# Patient Record
Sex: MALE | Employment: UNEMPLOYED | URBAN - METROPOLITAN AREA
[De-identification: names, ages, dates, MRNs, and addresses within clinical notes are randomized per-mention and may not be internally consistent; named-entity substitution may affect disease eponyms.]

---

## 2018-04-12 ENCOUNTER — HOSPITAL ENCOUNTER (INPATIENT)
Age: 53
LOS: 7 days | Discharge: HOME | DRG: 885 | End: 2018-04-19
Attending: PSYCHIATRY & NEUROLOGY | Admitting: PSYCHIATRY & NEUROLOGY
Payer: MEDICARE

## 2018-04-12 PROBLEM — F29 PSYCHOSIS (HCC): Status: ACTIVE | Noted: 2018-04-12

## 2018-04-12 PROBLEM — F23 ACUTE PSYCHOSIS (HCC): Status: ACTIVE | Noted: 2018-04-12

## 2018-04-12 PROCEDURE — 6370000000 HC RX 637 (ALT 250 FOR IP): Performed by: NURSE PRACTITIONER

## 2018-04-12 PROCEDURE — 1240000000 HC EMOTIONAL WELLNESS R&B

## 2018-04-12 RX ORDER — NICOTINE 21 MG/24HR
1 PATCH, TRANSDERMAL 24 HOURS TRANSDERMAL DAILY
Status: DISCONTINUED | OUTPATIENT
Start: 2018-04-13 | End: 2018-04-19 | Stop reason: HOSPADM

## 2018-04-12 RX ORDER — MAGNESIUM HYDROXIDE/ALUMINUM HYDROXICE/SIMETHICONE 120; 1200; 1200 MG/30ML; MG/30ML; MG/30ML
30 SUSPENSION ORAL EVERY 6 HOURS PRN
Status: DISCONTINUED | OUTPATIENT
Start: 2018-04-12 | End: 2018-04-19 | Stop reason: HOSPADM

## 2018-04-12 RX ORDER — HYDROXYZINE HYDROCHLORIDE 25 MG/1
50 TABLET, FILM COATED ORAL 3 TIMES DAILY PRN
Status: DISCONTINUED | OUTPATIENT
Start: 2018-04-12 | End: 2018-04-19 | Stop reason: HOSPADM

## 2018-04-12 RX ORDER — TRAZODONE HYDROCHLORIDE 50 MG/1
50 TABLET ORAL NIGHTLY PRN
Status: DISCONTINUED | OUTPATIENT
Start: 2018-04-13 | End: 2018-04-19 | Stop reason: HOSPADM

## 2018-04-12 RX ORDER — PHENTERMINE HYDROCHLORIDE 15 MG/1
15 CAPSULE ORAL EVERY MORNING
Status: ON HOLD | COMMUNITY
End: 2018-04-18 | Stop reason: HOSPADM

## 2018-04-12 RX ORDER — PERMETHRIN 50 MG/G
CREAM TOPICAL ONCE
Status: COMPLETED | OUTPATIENT
Start: 2018-04-12 | End: 2018-04-12

## 2018-04-12 RX ORDER — ATORVASTATIN CALCIUM 20 MG/1
40 TABLET, FILM COATED ORAL DAILY
Status: DISCONTINUED | OUTPATIENT
Start: 2018-04-13 | End: 2018-04-19 | Stop reason: HOSPADM

## 2018-04-12 RX ORDER — PERMETHRIN 50 MG/G
CREAM TOPICAL ONCE
COMMUNITY

## 2018-04-12 RX ORDER — BENZTROPINE MESYLATE 1 MG/ML
2 INJECTION INTRAMUSCULAR; INTRAVENOUS 2 TIMES DAILY PRN
Status: DISCONTINUED | OUTPATIENT
Start: 2018-04-12 | End: 2018-04-19 | Stop reason: HOSPADM

## 2018-04-12 RX ORDER — ATORVASTATIN CALCIUM 40 MG/1
40 TABLET, FILM COATED ORAL DAILY
COMMUNITY

## 2018-04-12 RX ORDER — HALOPERIDOL 5 MG/ML
5 INJECTION INTRAMUSCULAR EVERY 4 HOURS PRN
Status: DISCONTINUED | OUTPATIENT
Start: 2018-04-12 | End: 2018-04-19 | Stop reason: HOSPADM

## 2018-04-12 RX ORDER — ACETAMINOPHEN 325 MG/1
650 TABLET ORAL EVERY 4 HOURS PRN
Status: DISCONTINUED | OUTPATIENT
Start: 2018-04-12 | End: 2018-04-19 | Stop reason: HOSPADM

## 2018-04-12 RX ADMIN — PERMETHRIN: 50 CREAM TOPICAL at 23:00

## 2018-04-12 ASSESSMENT — SLEEP AND FATIGUE QUESTIONNAIRES
DIFFICULTY STAYING ASLEEP: YES
AVERAGE NUMBER OF SLEEP HOURS: 2
DO YOU HAVE DIFFICULTY SLEEPING: YES
DIFFICULTY ARISING: NO
SLEEP PATTERN: DIFFICULTY FALLING ASLEEP;DISTURBED/INTERRUPTED SLEEP;RESTLESSNESS
DIFFICULTY FALLING ASLEEP: YES
RESTFUL SLEEP: NO
DO YOU USE A SLEEP AID: NO

## 2018-04-12 ASSESSMENT — LIFESTYLE VARIABLES: HISTORY_ALCOHOL_USE: NO

## 2018-04-13 LAB
ABSOLUTE EOS #: 0.2 K/UL (ref 0–0.4)
ABSOLUTE IMMATURE GRANULOCYTE: ABNORMAL K/UL (ref 0–0.3)
ABSOLUTE LYMPH #: 1 K/UL (ref 1–4.8)
ABSOLUTE MONO #: 0.9 K/UL (ref 0.1–1.3)
ALBUMIN SERPL-MCNC: 3.6 G/DL (ref 3.5–5.2)
ALBUMIN/GLOBULIN RATIO: ABNORMAL (ref 1–2.5)
ALP BLD-CCNC: 120 U/L (ref 40–129)
ALT SERPL-CCNC: 23 U/L (ref 5–41)
ANION GAP SERPL CALCULATED.3IONS-SCNC: 9 MMOL/L (ref 9–17)
AST SERPL-CCNC: 27 U/L
BASOPHILS # BLD: 1 % (ref 0–2)
BASOPHILS ABSOLUTE: 0 K/UL (ref 0–0.2)
BILIRUB SERPL-MCNC: 0.69 MG/DL (ref 0.3–1.2)
BUN BLDV-MCNC: 10 MG/DL (ref 6–20)
BUN/CREAT BLD: ABNORMAL (ref 9–20)
CALCIUM SERPL-MCNC: 8.4 MG/DL (ref 8.6–10.4)
CHLORIDE BLD-SCNC: 107 MMOL/L (ref 98–107)
CHOLESTEROL/HDL RATIO: 2
CHOLESTEROL: 105 MG/DL
CO2: 24 MMOL/L (ref 20–31)
CREAT SERPL-MCNC: 0.64 MG/DL (ref 0.7–1.2)
DIFFERENTIAL TYPE: ABNORMAL
EOSINOPHILS RELATIVE PERCENT: 2 % (ref 0–4)
GFR AFRICAN AMERICAN: >60 ML/MIN
GFR NON-AFRICAN AMERICAN: >60 ML/MIN
GFR SERPL CREATININE-BSD FRML MDRD: ABNORMAL ML/MIN/{1.73_M2}
GFR SERPL CREATININE-BSD FRML MDRD: ABNORMAL ML/MIN/{1.73_M2}
GLUCOSE BLD-MCNC: 96 MG/DL (ref 70–99)
HCT VFR BLD CALC: 43 % (ref 41–53)
HDLC SERPL-MCNC: 53 MG/DL
HEMOGLOBIN: 14.4 G/DL (ref 13.5–17.5)
IMMATURE GRANULOCYTES: ABNORMAL %
LDL CHOLESTEROL: 38 MG/DL (ref 0–130)
LYMPHOCYTES # BLD: 13 % (ref 24–44)
MCH RBC QN AUTO: 32 PG (ref 26–34)
MCHC RBC AUTO-ENTMCNC: 33.5 G/DL (ref 31–37)
MCV RBC AUTO: 95.7 FL (ref 80–100)
MONOCYTES # BLD: 11 % (ref 1–7)
NRBC AUTOMATED: ABNORMAL PER 100 WBC
PDW BLD-RTO: 15 % (ref 11.5–14.9)
PLATELET # BLD: 207 K/UL (ref 150–450)
PLATELET ESTIMATE: ABNORMAL
PMV BLD AUTO: 8.6 FL (ref 6–12)
POTASSIUM SERPL-SCNC: 4.1 MMOL/L (ref 3.7–5.3)
RBC # BLD: 4.49 M/UL (ref 4.5–5.9)
RBC # BLD: ABNORMAL 10*6/UL
SEG NEUTROPHILS: 73 % (ref 36–66)
SEGMENTED NEUTROPHILS ABSOLUTE COUNT: 6.1 K/UL (ref 1.3–9.1)
SODIUM BLD-SCNC: 140 MMOL/L (ref 135–144)
THYROXINE, FREE: 0.97 NG/DL (ref 0.93–1.7)
TOTAL PROTEIN: 6.3 G/DL (ref 6.4–8.3)
TRIGL SERPL-MCNC: 72 MG/DL
TSH SERPL DL<=0.05 MIU/L-ACNC: 1.57 MIU/L (ref 0.3–5)
VLDLC SERPL CALC-MCNC: NORMAL MG/DL (ref 1–30)
WBC # BLD: 8.3 K/UL (ref 3.5–11)
WBC # BLD: ABNORMAL 10*3/UL

## 2018-04-13 PROCEDURE — 1240000000 HC EMOTIONAL WELLNESS R&B

## 2018-04-13 PROCEDURE — 80053 COMPREHEN METABOLIC PANEL: CPT

## 2018-04-13 PROCEDURE — 84439 ASSAY OF FREE THYROXINE: CPT

## 2018-04-13 PROCEDURE — 80061 LIPID PANEL: CPT

## 2018-04-13 PROCEDURE — 84443 ASSAY THYROID STIM HORMONE: CPT

## 2018-04-13 PROCEDURE — 85025 COMPLETE CBC W/AUTO DIFF WBC: CPT

## 2018-04-13 PROCEDURE — 6370000000 HC RX 637 (ALT 250 FOR IP): Performed by: NURSE PRACTITIONER

## 2018-04-13 PROCEDURE — 90792 PSYCH DIAG EVAL W/MED SRVCS: CPT | Performed by: NURSE PRACTITIONER

## 2018-04-13 PROCEDURE — 36415 COLL VENOUS BLD VENIPUNCTURE: CPT

## 2018-04-13 RX ORDER — RISPERIDONE 1 MG/1
1 TABLET, FILM COATED ORAL 2 TIMES DAILY
Status: DISCONTINUED | OUTPATIENT
Start: 2018-04-13 | End: 2018-04-17

## 2018-04-13 RX ADMIN — TRAZODONE HYDROCHLORIDE 50 MG: 50 TABLET ORAL at 20:29

## 2018-04-13 RX ADMIN — ATORVASTATIN CALCIUM 40 MG: 20 TABLET, FILM COATED ORAL at 08:10

## 2018-04-13 RX ADMIN — RISPERIDONE 1 MG: 1 TABLET ORAL at 09:56

## 2018-04-13 RX ADMIN — RISPERIDONE 1 MG: 1 TABLET ORAL at 20:29

## 2018-04-13 ASSESSMENT — LIFESTYLE VARIABLES: HISTORY_ALCOHOL_USE: NO

## 2018-04-14 PROCEDURE — 6370000000 HC RX 637 (ALT 250 FOR IP): Performed by: NURSE PRACTITIONER

## 2018-04-14 PROCEDURE — 1240000000 HC EMOTIONAL WELLNESS R&B

## 2018-04-14 PROCEDURE — 99232 SBSQ HOSP IP/OBS MODERATE 35: CPT | Performed by: PSYCHIATRY & NEUROLOGY

## 2018-04-14 RX ADMIN — HYDROXYZINE HYDROCHLORIDE 50 MG: 25 TABLET, FILM COATED ORAL at 21:14

## 2018-04-14 RX ADMIN — TRAZODONE HYDROCHLORIDE 50 MG: 50 TABLET ORAL at 21:22

## 2018-04-14 RX ADMIN — RISPERIDONE 1 MG: 1 TABLET ORAL at 21:22

## 2018-04-14 RX ADMIN — RISPERIDONE 1 MG: 1 TABLET ORAL at 08:47

## 2018-04-14 RX ADMIN — ATORVASTATIN CALCIUM 40 MG: 20 TABLET, FILM COATED ORAL at 08:47

## 2018-04-14 ASSESSMENT — PAIN SCALES - GENERAL: PAINLEVEL_OUTOF10: 0

## 2018-04-15 PROCEDURE — 1240000000 HC EMOTIONAL WELLNESS R&B

## 2018-04-15 PROCEDURE — 6370000000 HC RX 637 (ALT 250 FOR IP): Performed by: NURSE PRACTITIONER

## 2018-04-15 RX ADMIN — RISPERIDONE 1 MG: 1 TABLET ORAL at 22:06

## 2018-04-15 RX ADMIN — ATORVASTATIN CALCIUM 40 MG: 20 TABLET, FILM COATED ORAL at 08:16

## 2018-04-15 RX ADMIN — RISPERIDONE 1 MG: 1 TABLET ORAL at 08:17

## 2018-04-16 LAB
AMPHETAMINE SCREEN URINE: NEGATIVE
BARBITURATE SCREEN URINE: NEGATIVE
BENZODIAZEPINE SCREEN, URINE: NEGATIVE
BUPRENORPHINE URINE: NORMAL
CANNABINOID SCREEN URINE: NEGATIVE
COCAINE METABOLITE, URINE: NEGATIVE
MDMA URINE: NORMAL
METHADONE SCREEN, URINE: NEGATIVE
METHAMPHETAMINE, URINE: NORMAL
OPIATES, URINE: NEGATIVE
OXYCODONE SCREEN URINE: NEGATIVE
PHENCYCLIDINE, URINE: NEGATIVE
PROPOXYPHENE, URINE: NORMAL
TEST INFORMATION: NORMAL
TRICYCLIC ANTIDEPRESSANTS, UR: NORMAL

## 2018-04-16 PROCEDURE — 99232 SBSQ HOSP IP/OBS MODERATE 35: CPT | Performed by: NURSE PRACTITIONER

## 2018-04-16 PROCEDURE — 80307 DRUG TEST PRSMV CHEM ANLYZR: CPT

## 2018-04-16 PROCEDURE — 1240000000 HC EMOTIONAL WELLNESS R&B

## 2018-04-16 PROCEDURE — 6370000000 HC RX 637 (ALT 250 FOR IP): Performed by: NURSE PRACTITIONER

## 2018-04-16 RX ORDER — CETIRIZINE HYDROCHLORIDE 10 MG/1
10 TABLET ORAL DAILY
Status: DISCONTINUED | OUTPATIENT
Start: 2018-04-16 | End: 2018-04-18

## 2018-04-16 RX ADMIN — RISPERIDONE 1 MG: 1 TABLET ORAL at 21:33

## 2018-04-16 RX ADMIN — ATORVASTATIN CALCIUM 40 MG: 20 TABLET, FILM COATED ORAL at 07:51

## 2018-04-16 RX ADMIN — CETIRIZINE HYDROCHLORIDE 10 MG: 10 TABLET, FILM COATED ORAL at 18:01

## 2018-04-16 RX ADMIN — RISPERIDONE 1 MG: 1 TABLET ORAL at 07:51

## 2018-04-17 PROCEDURE — 6370000000 HC RX 637 (ALT 250 FOR IP): Performed by: NURSE PRACTITIONER

## 2018-04-17 PROCEDURE — 99232 SBSQ HOSP IP/OBS MODERATE 35: CPT | Performed by: NURSE PRACTITIONER

## 2018-04-17 PROCEDURE — 1240000000 HC EMOTIONAL WELLNESS R&B

## 2018-04-17 RX ORDER — RISPERIDONE 2 MG/1
2 TABLET, FILM COATED ORAL NIGHTLY
Status: DISCONTINUED | OUTPATIENT
Start: 2018-04-17 | End: 2018-04-18

## 2018-04-17 RX ORDER — RISPERIDONE 1 MG/1
1 TABLET, FILM COATED ORAL DAILY
Status: DISCONTINUED | OUTPATIENT
Start: 2018-04-18 | End: 2018-04-18

## 2018-04-17 RX ADMIN — ATORVASTATIN CALCIUM 40 MG: 20 TABLET, FILM COATED ORAL at 08:47

## 2018-04-17 RX ADMIN — CETIRIZINE HYDROCHLORIDE 10 MG: 10 TABLET, FILM COATED ORAL at 08:30

## 2018-04-17 RX ADMIN — RISPERIDONE 2 MG: 2 TABLET ORAL at 22:13

## 2018-04-17 RX ADMIN — RISPERIDONE 1 MG: 1 TABLET ORAL at 08:30

## 2018-04-18 PROCEDURE — 6370000000 HC RX 637 (ALT 250 FOR IP): Performed by: NURSE PRACTITIONER

## 2018-04-18 PROCEDURE — 1240000000 HC EMOTIONAL WELLNESS R&B

## 2018-04-18 PROCEDURE — 99232 SBSQ HOSP IP/OBS MODERATE 35: CPT | Performed by: NURSE PRACTITIONER

## 2018-04-18 RX ORDER — RISPERIDONE 2 MG/1
2 TABLET, FILM COATED ORAL NIGHTLY
Status: DISCONTINUED | OUTPATIENT
Start: 2018-04-18 | End: 2018-04-19 | Stop reason: HOSPADM

## 2018-04-18 RX ORDER — RISPERIDONE 1 MG/1
1 TABLET, FILM COATED ORAL DAILY
Qty: 14 TABLET | Refills: 0 | Status: SHIPPED | OUTPATIENT
Start: 2018-04-19

## 2018-04-18 RX ORDER — RISPERIDONE 2 MG/1
2 TABLET, FILM COATED ORAL 2 TIMES DAILY
Status: DISCONTINUED | OUTPATIENT
Start: 2018-04-18 | End: 2018-04-18

## 2018-04-18 RX ORDER — TRAZODONE HYDROCHLORIDE 50 MG/1
50 TABLET ORAL NIGHTLY PRN
Qty: 14 TABLET | Refills: 0 | Status: SHIPPED | OUTPATIENT
Start: 2018-04-18

## 2018-04-18 RX ORDER — RISPERIDONE 1 MG/1
1 TABLET, FILM COATED ORAL DAILY
Status: DISCONTINUED | OUTPATIENT
Start: 2018-04-19 | End: 2018-04-19 | Stop reason: HOSPADM

## 2018-04-18 RX ORDER — HYDROXYZINE 50 MG/1
50 TABLET, FILM COATED ORAL 3 TIMES DAILY PRN
Qty: 42 TABLET | Refills: 0 | Status: SHIPPED | OUTPATIENT
Start: 2018-04-18 | End: 2018-04-28

## 2018-04-18 RX ORDER — RISPERIDONE 1 MG/1
1 TABLET, FILM COATED ORAL ONCE
Status: DISCONTINUED | OUTPATIENT
Start: 2018-04-18 | End: 2018-04-18

## 2018-04-18 RX ORDER — RISPERIDONE 2 MG/1
2 TABLET, FILM COATED ORAL NIGHTLY
Qty: 14 TABLET | Refills: 0 | Status: SHIPPED | OUTPATIENT
Start: 2018-04-18

## 2018-04-18 RX ADMIN — HYDROXYZINE HYDROCHLORIDE 50 MG: 25 TABLET, FILM COATED ORAL at 00:05

## 2018-04-18 RX ADMIN — HYDROXYZINE HYDROCHLORIDE 50 MG: 25 TABLET, FILM COATED ORAL at 20:57

## 2018-04-18 RX ADMIN — RISPERIDONE 1 MG: 1 TABLET ORAL at 08:45

## 2018-04-18 RX ADMIN — ATORVASTATIN CALCIUM 40 MG: 20 TABLET, FILM COATED ORAL at 08:45

## 2018-04-18 RX ADMIN — RISPERIDONE 2 MG: 2 TABLET ORAL at 20:57

## 2018-04-19 VITALS
SYSTOLIC BLOOD PRESSURE: 130 MMHG | OXYGEN SATURATION: 99 % | RESPIRATION RATE: 14 BRPM | HEIGHT: 68 IN | HEART RATE: 108 BPM | WEIGHT: 165 LBS | TEMPERATURE: 98.1 F | BODY MASS INDEX: 25.01 KG/M2 | DIASTOLIC BLOOD PRESSURE: 74 MMHG

## 2018-04-19 PROCEDURE — 5130000000 HC BRIDGE APPOINTMENT

## 2018-04-19 PROCEDURE — 6370000000 HC RX 637 (ALT 250 FOR IP): Performed by: NURSE PRACTITIONER

## 2018-04-19 PROCEDURE — 99239 HOSP IP/OBS DSCHRG MGMT >30: CPT | Performed by: NURSE PRACTITIONER

## 2018-04-19 RX ADMIN — ATORVASTATIN CALCIUM 40 MG: 20 TABLET, FILM COATED ORAL at 08:03

## 2018-04-19 RX ADMIN — RISPERIDONE 1 MG: 1 TABLET ORAL at 08:04

## 2019-07-31 ENCOUNTER — OFFICE VISIT (OUTPATIENT)
Dept: ONCOLOGY | Age: 54
End: 2019-07-31

## 2019-07-31 ENCOUNTER — DOCUMENTATION ONLY (OUTPATIENT)
Dept: ONCOLOGY | Age: 54
End: 2019-07-31

## 2019-07-31 VITALS
SYSTOLIC BLOOD PRESSURE: 128 MMHG | TEMPERATURE: 97.8 F | BODY MASS INDEX: 26.37 KG/M2 | DIASTOLIC BLOOD PRESSURE: 85 MMHG | HEART RATE: 93 BPM | WEIGHT: 174 LBS | RESPIRATION RATE: 18 BRPM | HEIGHT: 68 IN | OXYGEN SATURATION: 98 %

## 2019-07-31 DIAGNOSIS — C7A.019 CARCINOID TUMOR OF SMALL INTESTINE, MALIGNANT (HCC): Primary | ICD-10-CM

## 2019-07-31 RX ORDER — FLUTICASONE PROPIONATE 50 MCG
2 SPRAY, SUSPENSION (ML) NASAL DAILY
COMMUNITY

## 2019-07-31 RX ORDER — DEXTROAMPHETAMINE SACCHARATE, AMPHETAMINE ASPARTATE, DEXTROAMPHETAMINE SULFATE AND AMPHETAMINE SULFATE 5; 5; 5; 5 MG/1; MG/1; MG/1; MG/1
20 TABLET ORAL 2 TIMES DAILY
COMMUNITY

## 2019-07-31 RX ORDER — ATORVASTATIN CALCIUM 10 MG/1
TABLET, FILM COATED ORAL DAILY
COMMUNITY

## 2019-07-31 RX ORDER — PHENTERMINE HYDROCHLORIDE 15 MG/1
15 CAPSULE ORAL
COMMUNITY

## 2019-07-31 NOTE — PROGRESS NOTES
Pt is here as a new Pt to get established for hx of Carcinoid, he reports feeling well, no pain. Visit Vitals  /85   Pulse 93   Temp 97.8 °F (36.6 °C)   Resp 18   Ht 5' 8\" (1.727 m)   Wt 174 lb (78.9 kg)   SpO2 98%   BMI 26.46 kg/m²       Pain Scale: 0 - No pain/10  Pain Location:       Pt has been screened for all eligibility/ineligibility criteria for Strata and has been determined eligible for this protocol. Informed consent was reviewed by RN with patient prior to signing. All questions were answered adequately by RN. Patient signed consent today for study strata. A copy of ICF given to patient. No additional questions at this time. Health Maintenance reviewed New Pt    1. Have you been to the ER, urgent care clinic since your last visit? Hospitalized since your last visit? New Pt    2. Have you seen or consulted any other health care providers outside of the [address] [place] [address] since your last visit? Include any pap smears or colon screen?   New Pt

## 2019-07-31 NOTE — PROGRESS NOTES
[facility] [facility] [place] [address] [address], [address] Sarah Jain: [phone]  F: [phone]    Reason for Visit:   Gloria Cruz is a 53 y.o. 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 Pathology showed well differentiated and low grade carcinoid tumor  · Radioembolization (Y-90) at some point  · Maintenance with Sandostatin 80 mg every 2 weeks  · Cardiac echo 2017 nl, EF 51-55% 8/14/19  · PET 4/24/19: Overall burden of disease unchanged but uptake increased    History of Present Illness: This is a 53 y.o. 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 He sees an endocrinologist, Dr. Avelino Charles, and is also followed by [facility]. He was originally diagnosed in 2008 and at that time had aggressive debulking with right hemicolectomy, lumpectomy, and partial resection of the liver metastases. He had radioembolization (Y-90) at some point as well. He has been on Sandostatin 80 mg every 2 weeks for quite some time. He is looking for an oncologist to work with his current treatment team. His current MD, Dr. Kim Giraldo, is retiring. He sees Dr. Quentin Dumont at [facility]. He has not had treatment since January 2019  due to a recent incarceration for 90 days. His last does of Sandostatin was 01/08/19. He is concerned about his elevated serotonin, which was 1470. He did have flushing and diarrhea about 2 weeks ago, but otherwise has been doing well. No other symptoms of carcinoid syndrome. The patient has had significantly elevated serotonin levels. They have been as high as .   We will be checking them again today. The patient has not had a urine for 5-HIAA's recently. The patient has not had any other therapy for his carcinoid other than Sandostatin and surgery and yttrium-90 with radioembolization of his liver. The patient has not had NexGen sequencing done. He has not had PDL 1 staining done. He is a current smoker    PPRT by dr gaye sheffield at [place]  Denied by [facility] for a second resection  He had an infected tooth which was pulled out recently. He is not employed and gets disability. He has been on Medicaid, but he needs to reapply. We will     No past medical history on file. No past surgical history on file. Social History     Tobacco Use    Smoking status: Current Every Day Smoker     Packs/day: 0.50     Types: Cigarettes    Smokeless tobacco: Never Used   Substance Use Topics    Alcohol use: Yes     Frequency: 4 or more times a week     Drinks per session: 1 or 2      No family history on file. Current Outpatient Medications   Medication Sig    phentermine (ADIPEX_P) 15 mg capsule Take 15 mg by mouth every morning.  atorvastatin (LIPITOR) 10 mg tablet Take  by mouth daily.  fluticasone propionate (FLONASE ALLERGY RELIEF) 50 mcg/actuation nasal spray 2 Sprays by Both Nostrils route daily.  dextroamphetamine-amphetamine (ADDERALL) 20 mg tablet Take 20 mg by mouth two (2) times a day. No current facility-administered medications for this visit. No Known Allergies     Review of Systems: A complete review of systems was obtained, negative except as described above.     Physical Exam:     Visit Vitals  /85   Pulse 93   Temp 97.8 °F (36.6 °C)   Resp 18   Ht 5' 8\" (1.727 m)   Wt 174 lb (78.9 kg)   SpO2 98%   BMI 26.46 kg/m²     ECOG PS: 0  General: No distress  Eyes: PERRLA, anicteric sclerae  HENT: Atraumatic, OP clear  Neck: Supple  Lymphatic: No cervical, supraclavicular, or inguinal adenopathy  Respiratory: CTA, normal respiratory effort  CV: Normal rate, regular rhythm, no murmurs, no peripheral edema  GI: Soft, nontender, nondistended, no masses, no hepatomegaly, no splenomegaly  MS: Normal gait and station. Skin: Warm and dry. No rashes, ecchymoses, or petechiae. Psych: Alert, oriented, appropriate affect, normal judgment/insight    Results:   ECHO 09/21/17  IMPRESSION  Normal LV function  Mild diastolic dysfunction    PET 4/24/19   IMPRESSION  The overall burden of disease is unchanged anatomically, with no new anatomic findings. However, the degree of radiotracer uptake involving the majority of lesions has significantly increased in the interval, and new uptake is noted in anatomically stable mesenteric soft tissue nodules    Outside records reviewed. Assessment:   1. Malignant carcinoid tumor of the small intestine with metastasis to the liver  Originally diagnosed in 2008 and had aggressive debulking with right hemicolectomy, lumpectomy, and partial resection of liver metastases  Pathology showed well differentiated and low grade carcinoid tumor  He had radioembolization (Y-90) of the liver at some point    Most recent labs were done on 1/07/19: Chromogranin A 8 nmol/L, CEA 2.9 ng/mL, Serotonin 1470, serum 1032, Vasointestinal Peptide(VIP) 23.5, Pancreatic Polypeptide 28.0, Pancreastatin 444, Neuokinin A 21  PET 4/24/19 shows burden of disease is unchanged but radiotracer uptake is increased  He has been receiving treatment with Sandostatin 80 mg every 2 weeks for quite some time. He has not had treatment for some time due to a recent incarceration for 90 days. His last does of Sandostatin was 01/08/19. He is concerned about his elevated serotonin, which was 14,000. We will get a NT-proBNP to be sure there is no evidence of heart failure. His last cardiac ECHO was 09/21/17.  He normally gets an ECHO every 2 years, so he is due for another shortly  He had had 5-HIAA in the past but these were discontinued and he is not sure exactly why. I will order a 5HIAA and serotonin level   I will get him consented for STRATA and order PDL1 staining    He has not been on treatment for over 6 months. He denies symptoms. We will start him on treatment with Sandostatin 60 mg monthly. If he becomes symptomatic, we will consider increasing his dose. 2.  Psychosocial  He is unemployed and on disability  He has been on Medicaid who helps pay for his Sandostatin which is expensive  He will need to reapply to [employer], our , will follow     Plan:   · Labs: NT-proBNP, 5HIAA, serotonin level  · Consent for STRATA  · PDL1 staining for Keytruda ordered  · He had a head CT recently and brings the disk with him today which we will have loaded into our system      I appreciate the opportunity to participate in Mr. Nyla Pérez seth.     Signed By: Dr. Kanika Jules

## 2019-07-31 NOTE — PROGRESS NOTES
This note will not be viewable in [address] Oncology Navigator  Psychosocial Assessment    Reason for Assessment:    []Depression  []Anxiety  []Caregiver [place]  []Maladaptive Coping with Serious Illness   [] Social Work Referral [x] Initial Assessment  [] Other     Sources of Information:    [x]Patient  []Family  []Staff  []Medical Record    Advance Care Planning:  No flowsheet data found. Patient does not have an advanced medical directive, and did not express interest in completing one today.     Mental Status:    [x]Alert  []Lethargic  []Unresponsive   [] Unable to assess   Oriented to:  [x]Person  [x]Place  [x]Time  [x]Situation      Barriers to Learning:    []Language  []Developmental  []Cognitive  []Altered Mental Status  []Visual/Hearing Impairment  []Unable to Read/Write  []Motivational   [x]No Barriers Identified  []Other:    Relationship Status:  [x]Single  []  []Significant Other/Life Partner  []  []  []      Living Circumstances:  [x]Lives Alone  []Family/Significant Other in Household  []Roommates  []Children in the Home  []Paid Caregivers  []Assisted Living Facility/Group Home  []Skilled [address]  []Homeless  []Incarcerated  []Environmental/Care Concerns  []other:    Employment Status:  []Employed Full-time []Employed Part-time []Homemaker [x] Disabled  [] Retired []Other:    Support System:    [x]Strong  []Fair  []Limited    Financial/Legal Concerns:    []Uninsured  []Limited Income/Resources  []Non-Citizen  [x]No Concerns Identified  []Financial POA:    []Other:    Hinduism/Spiritual/Existential:  []Strong Sense of Spirituality  []Involved in [facility]  []Request  Visit  []Expressing Kyler Wilton  [x]No Concerns Identified     Coping with Illness:         Patient: Family/Caregiver:   Understanding and Acceptance of Illness/Prognosis  [x] []   Strong Sense of Resilience [x] []   Self Reflection [] []   Engaged Support System [] []   Does not Readily Discuss Illness [] []   Denial of Terminal Status [] []   Anger [] []   Depression [] []   Anxiety/Fear [] []   Bargaining [] []   Recent Diagnosis/Prognosis [] []   Difficulties with Body Image [] []   Loss of Identity [] []   Excessive Substance Use [] []   Mental Health History [] []   Enmeshed Relationships [] []   History of Loss [] []   Anticipatory Grief [] []   Concern for Complicated Grief [] []   Suicidal Ideation or Plan [] []   Unable to assess [] []            Narrative:   Met with the patient during his office visit today. Patient is being seen for malignant carcinoid tumor of the small intestine with metastasis to the liver. The patient has received treatment in [facility] and [place] with [facility]. He mentioned that he sees a \"research Endocrinologist\" Dr. Kia Naylor in [facility]. The patient lives in his car at the moment. He sometimes stays at his son's house or father's house in [facility]. He is not interested in any shelter information, explaining that he utilizes the [facility], certain truck stops, and 24 hour laundromats to meet his needs. He was recently incarcerated for two DUI's for 3 months, although he denies actually driving at the time of the charges. He stated \"I don't have a drinking problem,\" when asked if he still drinks alcohol, and he declined any substance abuse information. The patient has three children Laduyen Dunlap - 21, [place] [address] - 15). He has visitation rights with his children. The patient receives $2400 each month in disability income. He has Medicare, but no longer has Medicaid. He plans to reapply for Medicaid as soon as possible. After some research, informed the patient that [employer] does not have assistance for patients with Medicare or Medicaid. Patient stated that \"that isn't true. \" Showed the patient the [employer] where it explained this, and provided the patient with a [employer] application. Also provided the patient with information for [facility], as he mentioned that he may need to file for bankruptcy due to significant medical debt and money owed to his former apartment complex. Provided this 's contact information as well as information regarding the complementary services provided by the [facility]. Assessment/Action:   1. Introduced self and role of this  in the [employer]. 2.  Informed the patient of the [facility] and available resources there. 3.  Continue to meet with the patient when he returns to the clinic for ongoing assessment of the patients adjustment to his diagnosis and treatment. 4.  Ongoing psychosocial support as desired by patient.       Plan/Referral:   Insurance/Entitlements referral  Financial/Medication assistance referral   Legal referral    Polly Cortez LCSW

## 2019-08-07 ENCOUNTER — HOSPITAL ENCOUNTER (OUTPATIENT)
Dept: INFUSION THERAPY | Age: 54
Discharge: HOME | End: 2019-08-07
Payer: MEDICARE

## 2019-08-07 VITALS
HEART RATE: 86 BPM | DIASTOLIC BLOOD PRESSURE: 75 MMHG | TEMPERATURE: 98.2 F | SYSTOLIC BLOOD PRESSURE: 124 MMHG | RESPIRATION RATE: 17 BRPM

## 2019-08-07 DIAGNOSIS — Z51.81 ENCOUNTER FOR MONITORING CARDIOTOXIC DRUG THERAPY: Primary | ICD-10-CM

## 2019-08-07 DIAGNOSIS — Z79.899 ENCOUNTER FOR MONITORING CARDIOTOXIC DRUG THERAPY: Primary | ICD-10-CM

## 2019-08-07 DIAGNOSIS — C7A.019 CARCINOID TUMOR OF SMALL INTESTINE, MALIGNANT (HCC): Primary | ICD-10-CM

## 2019-08-07 DIAGNOSIS — C7A.019 CARCINOID TUMOR OF SMALL INTESTINE, MALIGNANT (HCC): ICD-10-CM

## 2019-08-07 LAB
ANION GAP SERPL CALC-SCNC: 4 MMOL/L (ref 5–15)
BNP SERPL-MCNC: 152 PG/ML
BUN SERPL-MCNC: 9 MG/DL (ref 6–20)
BUN/CREAT SERPL: 20 (ref 12–20)
CALCIUM SERPL-MCNC: 9 MG/DL (ref 8.5–10.1)
CHLORIDE SERPL-SCNC: 111 MMOL/L (ref 97–108)
CO2 SERPL-SCNC: 27 MMOL/L (ref 21–32)
CREAT SERPL-MCNC: 0.46 MG/DL (ref 0.7–1.3)
GLUCOSE SERPL-MCNC: 81 MG/DL (ref 65–100)
POTASSIUM SERPL-SCNC: 4.9 MMOL/L (ref 3.5–5.1)
SODIUM SERPL-SCNC: 142 MMOL/L (ref 136–145)
TSH SERPL DL<=0.05 MIU/L-ACNC: 1.47 UIU/ML (ref 0.36–3.74)

## 2019-08-07 PROCEDURE — 83880 ASSAY OF NATRIURETIC PEPTIDE: CPT

## 2019-08-07 PROCEDURE — 96372 THER/PROPH/DIAG INJ SC/IM: CPT

## 2019-08-07 PROCEDURE — 80048 BASIC METABOLIC PNL TOTAL CA: CPT

## 2019-08-07 PROCEDURE — 36415 COLL VENOUS BLD VENIPUNCTURE: CPT

## 2019-08-07 PROCEDURE — 84443 ASSAY THYROID STIM HORMONE: CPT

## 2019-08-07 PROCEDURE — 84260 ASSAY OF SEROTONIN: CPT

## 2019-08-07 PROCEDURE — 74011250636 HC RX REV CODE- 250/636: Performed by: INTERNAL MEDICINE

## 2019-08-07 RX ADMIN — OCTREOTIDE ACETATE 60 MG: KIT at 13:50

## 2019-08-07 NOTE — PROGRESS NOTES
Outpatient Infusion Center Progress Note    2305 Pt admit to [facility] for Octreotide injection ambulatory in stable condition. Assessment completed. No new concerns voiced. Visit Vitals  /75   Pulse 86   Temp 98.2 °F (36.8 °C)   Resp 17       Medications Administered     octreotide acetate microspheres (SANDOSTATIN LAR DEPOT) injection 60 mg     Admin Date  08/07/2019 Action  Given Dose  60 mg Route  IntraMUSCular Administered By  Lani Still, RN                1350 Pt tolerated treatment well. D/c home ambulatory in no distress. Pt aware of next appointment scheduled for 9/4/19.     Recent Results (from the past 12 hour(s))   TSH 3RD GENERATION    Collection Time: 08/07/19  1:43 PM   Result Value Ref Range    TSH 1.47 0.36 - 5.52 uIU/mL   METABOLIC PANEL, BASIC    Collection Time: 08/07/19  1:43 PM   Result Value Ref Range    Sodium 142 136 - 145 mmol/L    Potassium 4.9 3.5 - 5.1 mmol/L    Chloride 111 (H) 97 - 108 mmol/L    CO2 27 21 - 32 mmol/L    Anion gap 4 (L) 5 - 15 mmol/L    Glucose 81 65 - 100 mg/dL    BUN 9 6 - 20 MG/DL    Creatinine 0.46 (L) 0.70 - 1.30 MG/DL    BUN/Creatinine ratio 20 12 - 20      GFR est AA >60 >60 ml/min/1.73m2    GFR est non-AA >60 >60 ml/min/1.73m2    Calcium 9.0 8.5 - 10.1 MG/DL   NT-PRO BNP    Collection Time: 08/07/19  1:43 PM   Result Value Ref Range    NT pro- (H) <125 PG/ML

## 2019-08-09 ENCOUNTER — TELEPHONE (OUTPATIENT)
Dept: ONCOLOGY | Age: 54
End: 2019-08-09

## 2019-08-09 NOTE — TELEPHONE ENCOUNTER
----- Message from Hugh Johnston MD sent at 8/9/2019  3:18 PM EDT -----  Regarding: RE: Dr Maddison Maciel  PCP will need to deal with the testosterone shots  ----- Message -----  From: Arielle Nuno RN  Sent: 8/8/2019   8:06 AM EDT  To: Hamlet Cabrera RN, Hugh Johnston MD  Subject: FW: Dr Loera/telephone                          Dr. Markos Akbar - please see messages below. I can explain to him our thoughts on the lower amount of Sandostatin. I did want to ask about the testosterone. I just looked back in his records. It does look like he was getting testosterone 20 mg IM monthly. From what I found his testosterone was last checked 3/6/18 and his free testosterone was 6.06 and testosterone 267. Do you want to put in an hospitals order to check his testosterone levels when he goes to get his Sandostatin and go from there? Let me know your thoughts and I'll call him back. ----- Message -----  From: Marcos Castellanos RN  Sent: 8/7/2019   5:01 PM EDT  To: Jose Bower RN  Subject: FW: Dr Adriana Benjamin, please see the message below, do we give/order testosterone? I have never seen it. ... Also, Dr Markos Akbar said Sandostatin monthly in his note. Thanks,  Prachi Hermosillo  ----- Message -----  From: Ana Garcia  Sent: 8/7/2019   4:48 PM EDT  To: Hamlet Cabrera RN  Subject: FW: Dr Ludwin Juan,   Please see message below on Dr. Markos Akbar patient.    ----- Message -----  From: Brooke Burrell: 8/7/2019   3:12 PM EDT  To: [facility]  Subject: Dr Loera/telephone                              Pt (p) [phone], said he had just came from the infusion ctr and they have a scheduled appt in 4 weeks his research lab has request for 80 mg every two weeks, if not will try to get as much as he could , he said they are trying to get it approved.  And hopes it should not be any delay in trying to get it approved any questions can speak with [facility] he also gets 200 mg of Testerone  since the shots is needed  Due to the  Sandostatin  Shots medication Drains his Testerone levels    Please call back to discuss further.

## 2019-08-09 NOTE — TELEPHONE ENCOUNTER
Call placed to patient and HIPAA verified. Patient notified that PCP would need to manage his testosterone - patient understands and is in agreement. I also discussed the decision of decreasing his Sandostatin dosage/frequency as he is asymptomatic and has been off Sandostatin for several months now. Patient understands but reports Medicaid has approved his Sandostatin to be dosed at 80 mg every 2 weeks. Patient reports he feels great, and I explained we can continue to monitor his symptoms and increase the Sandostatin as needed. Patient understands and thanked for call.

## 2019-08-10 LAB — SEROTONIN SER-MCNC: 926 NG/ML (ref 21–321)

## 2019-08-14 ENCOUNTER — HOSPITAL ENCOUNTER (OUTPATIENT)
Dept: NON INVASIVE DIAGNOSTICS | Age: 54
Discharge: HOME | End: 2019-08-14
Attending: NURSE PRACTITIONER
Payer: MEDICARE

## 2019-08-14 VITALS — HEIGHT: 68 IN | BODY MASS INDEX: 26.46 KG/M2

## 2019-08-14 DIAGNOSIS — Z51.81 ENCOUNTER FOR MONITORING CARDIOTOXIC DRUG THERAPY: ICD-10-CM

## 2019-08-14 DIAGNOSIS — Z79.899 ENCOUNTER FOR MONITORING CARDIOTOXIC DRUG THERAPY: ICD-10-CM

## 2019-08-14 DIAGNOSIS — C7A.019 CARCINOID TUMOR OF SMALL INTESTINE, MALIGNANT (HCC): ICD-10-CM

## 2019-08-14 LAB
ECHO AO ROOT DIAM: 3.67 CM
ECHO LA VOL BP: 49.53 ML (ref 18–58)
ECHO LV E' LATERAL VELOCITY: 9.21 CM/S
ECHO LV E' SEPTAL VELOCITY: 6.94 CM/S

## 2019-08-14 PROCEDURE — 93306 TTE W/DOPPLER COMPLETE: CPT

## 2019-08-21 ENCOUNTER — TELEPHONE (OUTPATIENT)
Dept: ONCOLOGY | Age: 54
End: 2019-08-21

## 2019-08-21 NOTE — TELEPHONE ENCOUNTER
Patient has been consented for STRATA trial, however, we are unsure of where his original biopsy was completed. Patient called to obtain information. Call went to  and message left for patient to call the office.

## 2019-09-20 ENCOUNTER — RESEARCH ENCOUNTER (OUTPATIENT)
Dept: ONCOLOGY | Age: 54
End: 2019-09-20

## 2019-09-20 NOTE — PROGRESS NOTES
Patient had biopsy in 2008, but location of where his biopsy is performed is not recorded in patient's records. Multiple attempts have been made to contact the patient, and he was a \"no call/noshow\" at his last appointment. Will take him off study for now, and if patient comes back and decides he wants strata testing done, he will need to be re-consented, and asked where he had his original biopsy performed for his cancer. Dr. Lul Ibrahim notified via email today 9/20/19.

## 2019-12-26 ENCOUNTER — TELEPHONE (OUTPATIENT)
Dept: ONCOLOGY | Age: 54
End: 2019-12-26

## 2019-12-26 ENCOUNTER — TELEPHONE (OUTPATIENT)
Dept: INFUSION THERAPY | Age: 54
End: 2019-12-26

## 2019-12-26 NOTE — TELEPHONE ENCOUNTER
Called patient to schedule sandostatin. Patient is scheduled to restart 01/06/20. Patient would like to speak to a nurse about testosterone.  # [phone]

## 2019-12-27 NOTE — TELEPHONE ENCOUNTER
Spoke to Pt, he is on the schedule for sandostatin, he is aware of this appointment, also relayed that we will not be prescribing Testosterone, he will call his PCP for this.

## 2020-01-06 ENCOUNTER — HOSPITAL ENCOUNTER (OUTPATIENT)
Dept: INFUSION THERAPY | Age: 55
End: 2020-01-06
Payer: MEDICARE

## 2020-01-06 ENCOUNTER — TELEPHONE (OUTPATIENT)
Dept: INFUSION THERAPY | Age: 55
End: 2020-01-06

## 2020-01-09 ENCOUNTER — HOSPITAL ENCOUNTER (OUTPATIENT)
Dept: INFUSION THERAPY | Age: 55
Discharge: HOME | End: 2020-01-09
Payer: MEDICARE

## 2020-01-09 VITALS
TEMPERATURE: 98.6 F | HEART RATE: 100 BPM | SYSTOLIC BLOOD PRESSURE: 126 MMHG | RESPIRATION RATE: 18 BRPM | DIASTOLIC BLOOD PRESSURE: 88 MMHG

## 2020-01-09 DIAGNOSIS — C7A.019 MALIGNANT CARCINOID TUMOR OF SMALL INTESTINE, UNSPECIFIED LOCATION (HCC): Primary | ICD-10-CM

## 2020-01-09 PROCEDURE — 74011250636 HC RX REV CODE- 250/636: Performed by: INTERNAL MEDICINE

## 2020-01-09 PROCEDURE — 96372 THER/PROPH/DIAG INJ SC/IM: CPT

## 2020-01-09 RX ADMIN — OCTREOTIDE ACETATE 60 MG: KIT at 14:56

## 2020-01-09 NOTE — PROGRESS NOTES
Outpatient Infusion Center Short Visit Progress Note    4963 Pt admit to [facility] for Sandostatin ambulatory in stable condition. Assessment completed. Pt has not received his medication in a couple of months and suffers from flushing and diarrhea. .     Visit Vitals  /88   Pulse 100   Temp 98.6 °F (37 °C)   Resp 18     Medications:  Medications Administered     octreotide acetate microspheres (SANDOSTATIN LAR DEPOT) injection 60 mg     Admin Date  01/09/2020 Action  Given Dose  60 mg Route  IntraMUSCular Administered By  Alivia Monsalve RN            30mg given IM in left and right glutes    1500 tolerated treatment well. D/c home ambulatory in no distress. Pt aware of next appointment scheduled for 2/3/20.

## 2020-02-03 ENCOUNTER — HOSPITAL ENCOUNTER (OUTPATIENT)
Dept: INFUSION THERAPY | Age: 55
End: 2020-02-03
Payer: MEDICARE

## 2020-02-04 ENCOUNTER — HOSPITAL ENCOUNTER (OUTPATIENT)
Dept: LAB | Age: 55
Discharge: HOME | End: 2020-02-04
Payer: MEDICARE

## 2020-02-04 ENCOUNTER — OFFICE VISIT (OUTPATIENT)
Dept: ONCOLOGY | Age: 55
End: 2020-02-04

## 2020-02-04 ENCOUNTER — HOSPITAL ENCOUNTER (OUTPATIENT)
Dept: INFUSION THERAPY | Age: 55
Discharge: HOME | End: 2020-02-04
Payer: MEDICARE

## 2020-02-04 VITALS
DIASTOLIC BLOOD PRESSURE: 81 MMHG | HEART RATE: 110 BPM | RESPIRATION RATE: 18 BRPM | TEMPERATURE: 98.2 F | SYSTOLIC BLOOD PRESSURE: 126 MMHG

## 2020-02-04 VITALS
WEIGHT: 188 LBS | SYSTOLIC BLOOD PRESSURE: 128 MMHG | DIASTOLIC BLOOD PRESSURE: 88 MMHG | HEIGHT: 68 IN | BODY MASS INDEX: 28.49 KG/M2 | OXYGEN SATURATION: 97 % | TEMPERATURE: 98.3 F | HEART RATE: 98 BPM | RESPIRATION RATE: 18 BRPM

## 2020-02-04 DIAGNOSIS — K76.9 LIVER LESION: ICD-10-CM

## 2020-02-04 DIAGNOSIS — C7A.019 MALIGNANT CARCINOID TUMOR OF SMALL INTESTINE, UNSPECIFIED LOCATION (HCC): Primary | ICD-10-CM

## 2020-02-04 LAB
ANION GAP SERPL CALC-SCNC: 3 MMOL/L (ref 5–15)
APTT PPP: 24 SEC (ref 24–33)
BASOPHILS # BLD AUTO: 0 X10E3/UL (ref 0–0.2)
BASOPHILS NFR BLD AUTO: 0 %
BUN SERPL-MCNC: 15 MG/DL (ref 6–20)
BUN/CREAT SERPL: 23 (ref 12–20)
CALCIUM SERPL-MCNC: 9.1 MG/DL (ref 8.5–10.1)
CHLORIDE SERPL-SCNC: 108 MMOL/L (ref 97–108)
CO2 SERPL-SCNC: 26 MMOL/L (ref 21–32)
CREAT SERPL-MCNC: 0.66 MG/DL (ref 0.7–1.3)
EOSINOPHIL # BLD AUTO: 0.1 X10E3/UL (ref 0–0.4)
EOSINOPHIL NFR BLD AUTO: 1 %
ERYTHROCYTE [DISTWIDTH] IN BLOOD BY AUTOMATED COUNT: 15.3 % (ref 11.6–15.4)
GLUCOSE SERPL-MCNC: 94 MG/DL (ref 65–100)
HCT VFR BLD AUTO: 39.8 % (ref 37.5–51)
HGB BLD-MCNC: 13.9 G/DL (ref 13–17.7)
INR PPP: 0.9 (ref 0.8–1.2)
LYMPHOCYTES # BLD AUTO: 1.5 X10E3/UL (ref 0.7–3.1)
LYMPHOCYTES NFR BLD AUTO: 19 %
MCH RBC QN AUTO: 30 PG (ref 26.6–33)
MCHC RBC AUTO-ENTMCNC: 34.9 G/DL (ref 31.5–35.7)
MCV RBC AUTO: 86 FL (ref 79–97)
MONOCYTES # BLD AUTO: 1.4 X10E3/UL (ref 0.1–0.9)
MONOCYTES NFR BLD AUTO: 18 %
NEUTROPHILS # BLD AUTO: 4.7 X10E3/UL (ref 1.4–7)
NEUTROPHILS NFR BLD AUTO: 62 %
PLATELET # BLD AUTO: 212 X10E3/UL (ref 150–450)
POTASSIUM SERPL-SCNC: 3.2 MMOL/L (ref 3.5–5.1)
PROTHROMBIN TIME: 9.3 SEC (ref 9.1–12)
RBC # BLD AUTO: 4.63 X10E6/UL (ref 4.14–5.8)
SODIUM SERPL-SCNC: 137 MMOL/L (ref 136–145)
TSH SERPL DL<=0.05 MIU/L-ACNC: 2.3 UIU/ML (ref 0.36–3.74)
WBC # BLD AUTO: 7.7 X10E3/UL (ref 3.4–10.8)

## 2020-02-04 PROCEDURE — 85025 COMPLETE CBC W/AUTO DIFF WBC: CPT

## 2020-02-04 PROCEDURE — 84443 ASSAY THYROID STIM HORMONE: CPT

## 2020-02-04 PROCEDURE — 85610 PROTHROMBIN TIME: CPT

## 2020-02-04 PROCEDURE — 36415 COLL VENOUS BLD VENIPUNCTURE: CPT

## 2020-02-04 PROCEDURE — 80048 BASIC METABOLIC PNL TOTAL CA: CPT

## 2020-02-04 PROCEDURE — 85730 THROMBOPLASTIN TIME PARTIAL: CPT

## 2020-02-04 PROCEDURE — 74011250636 HC RX REV CODE- 250/636: Performed by: NURSE PRACTITIONER

## 2020-02-04 PROCEDURE — 96372 THER/PROPH/DIAG INJ SC/IM: CPT

## 2020-02-04 RX ADMIN — OCTREOTIDE ACETATE 60 MG: KIT at 15:08

## 2020-02-04 NOTE — PROGRESS NOTES
[facility] Short Note                       Date: 2020    Name: Mary Valdez    MRN: [id number]         : 1965      1415 Pt admit to [facility] for Sandostatin/labs ambulatory in stable condition. Assessment completed. Pt has cracked tooth. Supposed to have surgery soon at [facility]. No other concerns voiced. Labs drawn peripherally and sent for processing. Please review pending lab results in [address] [place] [address]. Mr. Emily Anguiano vitals were reviewed prior to and after treatment. Patient Vitals for the past 12 hrs:   Temp Pulse Resp BP   20 1418 98.2 °F (36.8 °C) (!) 110 18 126/81     Lab results were obtained and reviewed. Recent Results (from the past 12 hour(s))   TSH 3RD GENERATION    Collection Time: 20  2:22 PM   Result Value Ref Range    TSH 2.30 0.36 - 0.51 uIU/mL   METABOLIC PANEL, BASIC    Collection Time: 20  2:22 PM   Result Value Ref Range    Sodium 137 136 - 145 mmol/L    Potassium 3.2 (L) 3.5 - 5.1 mmol/L    Chloride 108 97 - 108 mmol/L    CO2 26 21 - 32 mmol/L    Anion gap 3 (L) 5 - 15 mmol/L    Glucose 94 65 - 100 mg/dL    BUN 15 6 - 20 MG/DL    Creatinine 0.66 (L) 0.70 - 1.30 MG/DL    BUN/Creatinine ratio 23 (H) 12 - 20      GFR est AA >60 >60 ml/min/1.73m2    GFR est non-AA >60 >60 ml/min/1.73m2    Calcium 9.1 8.5 - 10.1 MG/DL       Medications given:   Medications Administered     octreotide acetate microspheres (SANDOSTATIN LAR DEPOT) injection 60 mg     Admin Date  2020 Action  Given Dose  60 mg Route  IntraMUSCular Administered By  Lisa Pride RN              Mr. Luan Mckinley tolerated the treatment, had no complaints, and was discharged from [place] [address] in stable condition at 1510.      Future Appointments   Date Time Provider Cheyanne Miramontes   3/2/2020  2:00 PM [facility]   3/2/2020  2:00 PM Eliud Moy MD [facility] 61   3/30/2020  2:00 PM BREMO FT CHAIR 2 AMBER [facility]   2020  2:00 PM NUSRAT CASTELLON [facility]IR [facility] URIEL Luz  February 4, 2020  4:24 PM

## 2020-02-04 NOTE — PROGRESS NOTES
[facility] [place] at [address] [place], [address] [address], [address] [place] [address] Sarah Enriquez [facility]: [phone]  F: [phone]    Reason for Visit:   Josefina Hickey is a 63 y.o. 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 Pathology showed well differentiated and low grade carcinoid tumor  · Radioembolization (Y-90) at some point  · Cardiac echo 2017 nl, EF 51-55% 8/14/19  · PET 4/24/19: Overall burden of disease unchanged but uptake increased  · Maintenance with Sandostatin 60 mg every 4 weeks    History of Present Illness: This is a 53 y.o. 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 He sees an endocrinologist, Dr. Brennon Marrero, and is also followed by John. He was originally diagnosed in 2008 and at that time had aggressive debulking with right hemicolectomy, lumpectomy, and partial resection of the liver metastases. He had radioembolization (Y-90) at some point as well. He has been on Sandostatin 80 mg every 2 weeks for quite some time. He transferred to us as his oncologist, Dr. Katerin Mitchell retired, and he was looking for an oncologist to work with his current treatment team. He sees Dr. Robert Mclain at [facility]. When  has not had treatment since January 2019  due to a recent incarceration for 90 days. His last does of Sandostatin was 01/08/19. He is concerned about his elevated serotonin, which was 1470. He did have flushing and diarrhea about 2 weeks ago, but otherwise has been doing well. No other symptoms of carcinoid syndrome. The patient has had significantly elevated serotonin levels.   They have been as high as . We will be checking them again today. The patient has not had a urine for 5-HIAA's recently. The patient has not had any other therapy for his carcinoid other than Sandostatin and surgery and yttrium-90 with radioembolization of his liver. The patient has not had NexGen sequencing done. He has not had PDL 1 staining done. He is a current smoker    Patient is here today for follow up. He was lost to follow up for some time as he was incarcerated for 3 months for violating probation. He is now being seen by Dr. Lenka Bowling at [facility]. His next appointment with her is 2/24/20 and he will have scans done that day as well. The plan is for him to have a partial liver resection. Patient would like for his tooth to be extracted due to an infection. Will check his CBC and caogs today. We will see patient back in the office in 1 month to discuss CT scans done with Dr. Nelsy Willingham at the end of the month. No past medical history on file. No past surgical history on file. Social History     Tobacco Use    Smoking status: Current Every Day Smoker     Packs/day: 0.50     Types: Cigarettes    Smokeless tobacco: Never Used   Substance Use Topics    Alcohol use: Yes     Frequency: 4 or more times a week     Drinks per session: 1 or 2      No family history on file. Current Outpatient Medications   Medication Sig    phentermine (ADIPEX_P) 15 mg capsule Take 15 mg by mouth every morning.  atorvastatin (LIPITOR) 10 mg tablet Take  by mouth daily.  fluticasone propionate (FLONASE ALLERGY RELIEF) 50 mcg/actuation nasal spray 2 Sprays by Both Nostrils route daily.  dextroamphetamine-amphetamine (ADDERALL) 20 mg tablet Take 20 mg by mouth two (2) times a day. No current facility-administered medications for this visit. No Known Allergies     Review of Systems: A complete review of systems was obtained, negative except as described above.     Physical Exam:     Visit Vitals  /88 Pulse 98   Temp 98.3 °F (36.8 °C)   Resp 18   Ht 5' 8\" (1.727 m)   Wt 188 lb (85.3 kg)   SpO2 97%   BMI 28.59 kg/m²     ECOG PS: 0  General: No distress  Eyes: PERRLA, anicteric sclerae  HENT: Atraumatic, OP clear  Neck: Supple  Lymphatic: No cervical, supraclavicular, or inguinal adenopathy  Respiratory: CTA, normal respiratory effort  CV: Normal rate, regular rhythm, no murmurs, no peripheral edema  GI: Soft, nontender, nondistended, no masses, no hepatomegaly, no splenomegaly  MS: Normal gait and station. Skin: Warm and dry. No rashes, ecchymoses, or petechiae. Psych: Alert, oriented, appropriate affect, normal judgment/insight    Results:   ECHO 09/21/17  IMPRESSION  Normal LV function  Mild diastolic dysfunction    PET 4/24/19   IMPRESSION  The overall burden of disease is unchanged anatomically, with no new anatomic findings. However, the degree of radiotracer uptake involving the majority of lesions has significantly increased in the interval, and new uptake is noted in anatomically stable mesenteric soft tissue nodules    Outside records reviewed. Assessment:   1. Malignant carcinoid tumor of the small intestine with metastasis to the liver  Originally diagnosed in 2008 and had aggressive debulking with right hemicolectomy, lumpectomy, and partial resection of liver metastases  Pathology showed well differentiated and low grade carcinoid tumor  He had radioembolization (Y-90) of the liver at some point    Labs done on 1/07/19: Chromogranin A 8 nmol/L, CEA 2.9 ng/mL, Serotonin 1470, serum 1032, Vasointestinal Peptide(VIP) 23.5, Pancreatic Polypeptide 28.0, Pancreastatin 444, Neuokinin A 21  PET 4/24/19 shows burden of disease is unchanged but radiotracer uptake is increased    Treatment with Sandostatin 60 mg monthly. If he becomes symptomatic, we will consider increasing his dose. Plan for partial liver resection per patient.  He has an appointment with Dr. Octavio Pagan at [facility] on 2/24/20, and he will have scans done that day as well. 2.  Tooth abscess  Patient needs a tooth removed  Will check his CBC and coags today to be sure he is safe for tooth extraction    3. Psychosocial  He is unemployed and on disability  He has been on Medicaid who helps pay for his Sandostatin which is expensive  Ryann Goldberg, our , will follow     Plan:   · Labs: CBC w. Diff, PT/INR, PTT  · Follow up in office in 1 month     I appreciate the opportunity to participate in [facility].     Signed By: Dr. Stephanie Oh

## 2020-03-02 ENCOUNTER — OFFICE VISIT (OUTPATIENT)
Dept: ONCOLOGY | Age: 55
End: 2020-03-02

## 2020-03-02 ENCOUNTER — HOSPITAL ENCOUNTER (OUTPATIENT)
Dept: INFUSION THERAPY | Age: 55
Discharge: HOME | End: 2020-03-02
Payer: MEDICARE

## 2020-03-02 VITALS
WEIGHT: 188 LBS | HEIGHT: 68 IN | DIASTOLIC BLOOD PRESSURE: 99 MMHG | OXYGEN SATURATION: 97 % | HEART RATE: 91 BPM | TEMPERATURE: 98 F | RESPIRATION RATE: 18 BRPM | SYSTOLIC BLOOD PRESSURE: 157 MMHG | BODY MASS INDEX: 28.49 KG/M2

## 2020-03-02 VITALS
RESPIRATION RATE: 18 BRPM | HEART RATE: 89 BPM | TEMPERATURE: 98.2 F | DIASTOLIC BLOOD PRESSURE: 84 MMHG | SYSTOLIC BLOOD PRESSURE: 138 MMHG

## 2020-03-02 DIAGNOSIS — C7A.019 MALIGNANT CARCINOID TUMOR OF SMALL INTESTINE, UNSPECIFIED LOCATION (HCC): Primary | ICD-10-CM

## 2020-03-02 PROCEDURE — 96372 THER/PROPH/DIAG INJ SC/IM: CPT

## 2020-03-02 PROCEDURE — 74011250636 HC RX REV CODE- 250/636: Performed by: NURSE PRACTITIONER

## 2020-03-02 RX ADMIN — OCTREOTIDE ACETATE 60 MG: KIT at 14:55

## 2020-03-02 NOTE — PROGRESS NOTES
[facility] [place] at [address], [id number] [place] [address] Road, [address] [place] [address]  Giuliana Holbrook: [phone]  F: [phone]    Reason for Visit:   Lala Navarro is a 63 y.o. 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 Pathology showed well differentiated and low grade carcinoid tumor  · Radioembolization (Y-90) at some point  · Cardiac echo 2017 nl, EF 51-55% 8/14/19  · PET 4/24/19: Overall burden of disease unchanged but uptake increased  · CT C/A/P 2/24/20 with mild worsening rosalind mets at celiac axis, retroperitoneum  · Maintenance with Sandostatin 60 mg every 4 weeks    History of Present Illness: This is a 53 y.o. 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 He sees an endocrinologist, Dr. Luca Huang, and is also followed by [facility]. He was originally diagnosed in 2008 and at that time had aggressive debulking with right hemicolectomy, lumpectomy, and partial resection of the liver metastases. He had radioembolization (Y-90) at some point as well. He has been on Sandostatin 80 mg every 2 weeks for quite some time. He transferred to us as his oncologist, Dr. Ibeth Bucio retired, and he was looking for an oncologist to work with his current treatment team. He sees Dr. Gaston Whitaker at [facility]. When  has not had treatment since January 2019  due to a recent incarceration for 90 days. His last does of Sandostatin was 01/08/19. He is concerned about his elevated serotonin, which was 1470. He did have flushing and diarrhea about 2 weeks ago, but otherwise has been doing well. No other symptoms of carcinoid syndrome.   The patient has had significantly elevated serotonin levels. They have been as high as . We will be checking them again today. The patient has not had a urine for 5-HIAA's recently. The patient has not had any other therapy for his carcinoid other than Sandostatin and surgery and yttrium-90 with radioembolization of his liver. The patient has not had NexGen sequencing done. He has not had PDL 1 staining done. He is a current smoker    Patient is here today for follow up. Patient saw Dr. Juvenal Gillespie 2/24/20 and had a CT scan at that time. Patient reports MD does not want to do a liver resection at this time. MD wants patient to continue Sandostatin until repeat CT in June. Patient reports he is feeling better since restarting Sandostatin. Patient reports he has been off of his adderall for some time as his psychiatrist retired. He reports weight gain due to not having his adderall. He will be back on his medication tomorrow per patient. No past medical history on file. No past surgical history on file. Social History     Tobacco Use    Smoking status: Current Every Day Smoker     Packs/day: 0.50     Types: Cigarettes    Smokeless tobacco: Never Used   Substance Use Topics    Alcohol use: Yes     Frequency: 4 or more times a week     Drinks per session: 1 or 2      No family history on file. Current Outpatient Medications   Medication Sig    phentermine (ADIPEX_P) 15 mg capsule Take 15 mg by mouth every morning.  atorvastatin (LIPITOR) 10 mg tablet Take  by mouth daily.  fluticasone propionate (FLONASE ALLERGY RELIEF) 50 mcg/actuation nasal spray 2 Sprays by Both Nostrils route daily.  dextroamphetamine-amphetamine (ADDERALL) 20 mg tablet Take 20 mg by mouth two (2) times a day. No current facility-administered medications for this visit.       Facility-Administered Medications Ordered in Other Visits   Medication Dose Route Frequency    octreotide acetate microspheres (SANDOSTATIN LAR DEPOT) injection 60 mg  60 mg IntraMUSCular ONCE      No Known Allergies     Review of Systems: A complete review of systems was obtained, negative except as described above. Physical Exam:     Visit Vitals  BP (!) 157/99   Pulse 91   Temp 98 °F (36.7 °C)   Resp 18   Ht 5' 8\" (1.727 m)   Wt 188 lb (85.3 kg)   SpO2 97%   BMI 28.59 kg/m²     ECOG PS: 0  General: No distress  Eyes: PERRL, anicteric sclerae  HENT: Atraumatic, OP clear  Neck: Supple  Lymphatic: No cervical, supraclavicular, or axillary adenopathy  Respiratory: CTA, normal respiratory effort  CV: Normal rate, regular rhythm, no murmurs, no peripheral edema  GI: Soft, nontender, nondistended, no masses, no hepatomegaly, no splenomegaly  MS: Normal gait and station. Skin: Warm and dry. No rashes, ecchymoses, or petechiae. Psych: Alert, oriented, appropriate affect, normal judgment/insight    Results:   ECHO 09/21/17  IMPRESSION  Normal LV function  Mild diastolic dysfunction    PET 4/24/19   IMPRESSION  The overall burden of disease is unchanged anatomically, with no new anatomic findings. However, the degree of radiotracer uptake involving the majority of lesions has significantly increased in the interval, and new uptake is noted in anatomically stable mesenteric soft tissue nodules    CT C/A/P 2/24/20  IMPRESSION  Mild worsening rosalind metastases at celiac axis, retroperitoneum. Similar mesenteric mass sigmoid, lymph node adjacent to descending colon, omental nodule, nodule posterior right lobe liver, pancreatic lesion, left perinephric nodule, deep pelvic mass. Bilateral nephrolithiasis including a renal pelvis stone on the left. No hydronephrosis. No signs of recently passed stone. Outside records reviewed. Assessment:   1.   Malignant carcinoid tumor of the small intestine with metastasis to the liver  Originally diagnosed in 2008 and had aggressive debulking with right hemicolectomy, lumpectomy, and partial resection of liver metastases  Pathology showed well differentiated and low grade carcinoid tumor  He had radioembolization (Y-90) of the liver at some point    Labs done on 1/07/19: Chromogranin A 8 nmol/L, CEA 2.9 ng/mL, Serotonin 1470, serum 1032, Vasointestinal Peptide(VIP) 23.5, Pancreatic Polypeptide 28.0, Pancreastatin 444, Neuokinin A 21  PET 4/24/19 shows burden of disease is unchanged but radiotracer uptake is increased    Treatment with Sandostatin 60 mg monthly. If he becomes symptomatic, we will consider increasing his dose. Patient saw Dr. Gilberto Harrison at [facility] on 2/24/20. MD does not want to do a liver resection at this time. Plan for repeat imaging in June per patient. CT 2/24/20 reviewed in Care Everywhere    2. Psychosocial  He is unemployed and on disability  He has been on Medicaid who helps pay for his Sandostatin which is expensive  Glendy Quiroga, our , will follow     Plan:     · Sandostatin 60 mg every 4 weeks  · Follow up in office in 4 months     I appreciate the opportunity to participate in  Katiuska Adhikari seth.

## 2020-03-02 NOTE — PROGRESS NOTES
Outpatient Infusion Center Progress Note    1400 Pt admit to [facility] for Sandostatin   ambulatory in stable condition. Assessment completed No new concerns voiced. Visit Vitals  /84   Pulse 89   Temp 98.2 °F (36.8 °C)   Resp 18       Medications:  Medications Administered     octreotide acetate microspheres (SANDOSTATIN LAR DEPOT) injection 60 mg     Admin Date  03/02/2020 Action  Given Dose  60 mg Route  IntraMUSCular Administered By  Tim PATEL              Im injections given in R and L Glut    1515 Pt tolerated treatment well. D/c home ambulatory in no distress. Pt aware of next appointment scheduled for 3/30/20.

## 2020-03-02 NOTE — PROGRESS NOTES
Pt is here for routine follow and to discuss plan moving forward. Visit Vitals  BP (!) 157/99   Pulse 91   Temp 98 °F (36.7 °C)   Resp 18   Ht 5' 8\" (1.727 m)   Wt 188 lb (85.3 kg)   SpO2 97%   BMI 28.59 kg/m²       Pain Scale: 0 - No pain/10  Pain Location:       1. Have you been to the ER, urgent care clinic since your last visit? Hospitalized since your last visit? Yes, [facility]    2. Have you seen or consulted any other health care providers outside of the [address] [place] [address] since your last visit? Include any pap smears or colon screening.   No    Health Maintenance reviewed

## 2020-03-30 ENCOUNTER — HOSPITAL ENCOUNTER (OUTPATIENT)
Dept: INFUSION THERAPY | Age: 55
End: 2020-03-30
Payer: MEDICARE

## 2020-03-31 ENCOUNTER — HOSPITAL ENCOUNTER (OUTPATIENT)
Dept: INFUSION THERAPY | Age: 55
Discharge: HOME | End: 2020-03-31
Payer: MEDICARE

## 2020-03-31 VITALS
SYSTOLIC BLOOD PRESSURE: 130 MMHG | HEART RATE: 92 BPM | RESPIRATION RATE: 18 BRPM | DIASTOLIC BLOOD PRESSURE: 83 MMHG | TEMPERATURE: 97.8 F

## 2020-03-31 DIAGNOSIS — C7A.019 MALIGNANT CARCINOID TUMOR OF SMALL INTESTINE, UNSPECIFIED LOCATION (HCC): Primary | ICD-10-CM

## 2020-03-31 PROCEDURE — 96372 THER/PROPH/DIAG INJ SC/IM: CPT

## 2020-03-31 PROCEDURE — 74011250636 HC RX REV CODE- 250/636: Performed by: NURSE PRACTITIONER

## 2020-03-31 RX ADMIN — OCTREOTIDE ACETATE 60 MG: KIT at 16:33

## 2020-03-31 NOTE — PROGRESS NOTES
Outpatient Infusion Center Progress Note    1629 Pt admit to [facility] for Sandostatin   ambulatory in stable condition. Assessment completed No new concerns voiced. Visit Vitals  /83   Pulse 92   Temp 97.8 °F (36.6 °C)   Resp 18       Medications:  Medications Administered     octreotide acetate microspheres (SANDOSTATIN LAR DEPOT) injection 60 mg     Admin Date  03/31/2020 Action  Given Dose  60 mg Route  IntraMUSCular Administered By  Richard Singh, RN                   IM injections given in R and L Glut    1648 Pt tolerated treatment well. D/c home ambulatory in no distress. Pt aware of next appointment scheduled for 4/27/20.

## 2020-04-23 ENCOUNTER — TELEPHONE (OUTPATIENT)
Dept: ONCOLOGY | Age: 55
End: 2020-04-23

## 2020-04-23 NOTE — TELEPHONE ENCOUNTER
Patient called with concerns regarding recent move and possibly postponing [facility] appt Monday but would like to know if it will cause any problems for his health.      [phone]

## 2020-04-24 NOTE — TELEPHONE ENCOUNTER
Spoke with Pt, rescheduled for a time that was more convenient for him, ok'd with Dr Frannie escobar.

## 2020-04-27 ENCOUNTER — HOSPITAL ENCOUNTER (OUTPATIENT)
Dept: INFUSION THERAPY | Age: 55
End: 2020-04-27

## 2020-05-04 ENCOUNTER — HOSPITAL ENCOUNTER (OUTPATIENT)
Dept: INFUSION THERAPY | Age: 55
Discharge: HOME | End: 2020-05-04
Payer: MEDICARE

## 2020-05-04 VITALS
TEMPERATURE: 97.5 F | RESPIRATION RATE: 18 BRPM | HEART RATE: 102 BPM | SYSTOLIC BLOOD PRESSURE: 118 MMHG | DIASTOLIC BLOOD PRESSURE: 80 MMHG

## 2020-05-04 DIAGNOSIS — C7A.019 MALIGNANT CARCINOID TUMOR OF SMALL INTESTINE, UNSPECIFIED LOCATION (HCC): Primary | ICD-10-CM

## 2020-05-04 LAB
GLUCOSE SERPL-MCNC: 100 MG/DL (ref 65–100)
TSH SERPL DL<=0.05 MIU/L-ACNC: 1.6 UIU/ML (ref 0.36–3.74)

## 2020-05-04 PROCEDURE — 82947 ASSAY GLUCOSE BLOOD QUANT: CPT

## 2020-05-04 PROCEDURE — 84443 ASSAY THYROID STIM HORMONE: CPT

## 2020-05-04 PROCEDURE — 96372 THER/PROPH/DIAG INJ SC/IM: CPT

## 2020-05-04 PROCEDURE — 74011250636 HC RX REV CODE- 250/636: Performed by: INTERNAL MEDICINE

## 2020-05-04 PROCEDURE — 36415 COLL VENOUS BLD VENIPUNCTURE: CPT

## 2020-05-04 RX ADMIN — OCTREOTIDE ACETATE 60 MG: KIT at 15:38

## 2020-05-04 NOTE — PROGRESS NOTES
Outpatient Infusion Center Progress Note    2092 Pt admit to [facility] for Sandostatin   ambulatory in stable condition. Assessment completed No new concerns voiced. Visit Vitals  /80   Pulse (!) 102   Temp 97.5 °F (36.4 °C)   Resp 18       Medications:  Medications Administered     octreotide acetate microspheres (SANDOSTATIN LAR DEPOT) injection 60 mg     Admin Date  05/04/2020 Action  Given Dose  60 mg Route  IntraMUSCular Administered By  Dimas Fry RN                  IM injections given in R and L Glut    1520 Pt tolerated treatment well. D/c home ambulatory in no distress.  Pt aware of next appointment scheduled for   Future Appointments   Date Time Provider Cheyanne Miramontes   6/1/2020  2:00 PM G1 NAI Rashid [facility] H   6/29/2020  2:00 PM G2 NAI Rashid 276   6/30/2020  3:15 PM Kelsey Britton [facility]   7/27/2020  2:00 PM G2 NAI DANIELS [facility]

## 2020-05-11 ENCOUNTER — APPOINTMENT (OUTPATIENT)
Dept: INFUSION THERAPY | Age: 55
End: 2020-05-11
Payer: MEDICARE

## 2020-05-25 ENCOUNTER — APPOINTMENT (OUTPATIENT)
Dept: INFUSION THERAPY | Age: 55
End: 2020-05-25

## 2020-05-26 ENCOUNTER — APPOINTMENT (OUTPATIENT)
Dept: INFUSION THERAPY | Age: 55
End: 2020-05-26

## 2020-06-01 ENCOUNTER — HOSPITAL ENCOUNTER (OUTPATIENT)
Dept: INFUSION THERAPY | Age: 55
End: 2020-06-01

## 2020-06-08 ENCOUNTER — APPOINTMENT (OUTPATIENT)
Dept: INFUSION THERAPY | Age: 55
End: 2020-06-08

## 2020-06-09 ENCOUNTER — HOSPITAL ENCOUNTER (OUTPATIENT)
Dept: INFUSION THERAPY | Age: 55
End: 2020-06-09

## 2020-06-16 ENCOUNTER — HOSPITAL ENCOUNTER (OUTPATIENT)
Dept: INFUSION THERAPY | Age: 55
Discharge: HOME | End: 2020-06-16
Payer: MEDICARE

## 2020-06-16 VITALS — SYSTOLIC BLOOD PRESSURE: 131 MMHG | TEMPERATURE: 97.8 F | RESPIRATION RATE: 18 BRPM | DIASTOLIC BLOOD PRESSURE: 82 MMHG

## 2020-06-16 DIAGNOSIS — C7A.019 MALIGNANT CARCINOID TUMOR OF SMALL INTESTINE, UNSPECIFIED LOCATION (HCC): Primary | ICD-10-CM

## 2020-06-16 PROCEDURE — 74011250636 HC RX REV CODE- 250/636: Performed by: INTERNAL MEDICINE

## 2020-06-16 PROCEDURE — 96372 THER/PROPH/DIAG INJ SC/IM: CPT

## 2020-06-16 RX ADMIN — OCTREOTIDE ACETATE 60 MG: KIT at 16:34

## 2020-06-16 NOTE — ROUTINE PROCESS
1627 Pt admit to [facility] for Sandostatin ambulatory in stable condition. Assessment completed. No new concerns voiced. Visit Vitals /82 Temp 97.8 °F (36.6 °C) Resp 18 Medications: 
Medications Administered   
 octreotide acetate microspheres (SANDOSTATIN LAR DEPOT) injection 60 mg   
 Admin Date 
06/16/2020 Action Given Dose 60 mg Route IntraMUSCular Administered By 
Lucie Luna RN  
  
  
  
 
 
 
8875 Pt tolerated treatment well. D/c home ambulatory in no distress. Pt aware of next hospitals appointment scheduled for 7/14/2020.

## 2020-06-22 ENCOUNTER — APPOINTMENT (OUTPATIENT)
Dept: INFUSION THERAPY | Age: 55
End: 2020-06-22
Payer: MEDICARE

## 2020-06-29 ENCOUNTER — APPOINTMENT (OUTPATIENT)
Dept: INFUSION THERAPY | Age: 55
End: 2020-06-29

## 2020-07-06 ENCOUNTER — APPOINTMENT (OUTPATIENT)
Dept: INFUSION THERAPY | Age: 55
End: 2020-07-06

## 2020-07-27 ENCOUNTER — APPOINTMENT (OUTPATIENT)
Dept: INFUSION THERAPY | Age: 55
End: 2020-07-27

## 2020-07-27 ENCOUNTER — HOSPITAL ENCOUNTER (OUTPATIENT)
Dept: INFUSION THERAPY | Age: 55
Discharge: HOME | End: 2020-07-27
Payer: MEDICARE

## 2020-07-27 VITALS
DIASTOLIC BLOOD PRESSURE: 84 MMHG | RESPIRATION RATE: 18 BRPM | HEART RATE: 91 BPM | SYSTOLIC BLOOD PRESSURE: 128 MMHG | TEMPERATURE: 97.5 F

## 2020-07-27 DIAGNOSIS — C7A.019 MALIGNANT CARCINOID TUMOR OF SMALL INTESTINE, UNSPECIFIED LOCATION (HCC): Primary | ICD-10-CM

## 2020-07-27 PROCEDURE — 96372 THER/PROPH/DIAG INJ SC/IM: CPT

## 2020-07-27 PROCEDURE — 74011250636 HC RX REV CODE- 250/636: Performed by: NURSE PRACTITIONER

## 2020-07-27 RX ADMIN — OCTREOTIDE ACETATE 60 MG: KIT at 15:33

## 2020-07-27 NOTE — PROGRESS NOTES
Outpatient Infusion Center Progress Note    1651 Pt admit to [facility] for Sandostatin injection ambulatory in stable condition. Assessment completed. No new concerns voiced. Patient is due for a TSH and GLUCOSE level. Patient states that he worked out the labs with Dr. Jazzy Connors and his research doctor in [facility]. Patient reports he will be getting a full set of labs and scans next week and he refuses labs at this time. Patient reports he will have all results faxed to Dr. Elise Eaton office. Visit Vitals  /84 (BP 1 Location: Right arm, BP Patient Position: Sitting)   Pulse 91   Temp 97.5 °F (36.4 °C)   Resp 18       Medications Administered     octreotide acetate microspheres (SANDOSTATIN LAR DEPOT) injection 60 mg     Admin Date  07/27/2020 Action  Given Dose  60 mg Route  IntraMUSCular Administered By  Viri Chaves                0873 Pt tolerated treatment well. D/c home ambulatory in no distress. Pt aware of next appointment scheduled.     Ben Muro RN

## 2020-08-03 ENCOUNTER — APPOINTMENT (OUTPATIENT)
Dept: INFUSION THERAPY | Age: 55
End: 2020-08-03

## 2020-08-07 ENCOUNTER — DOCUMENTATION ONLY (OUTPATIENT)
Dept: ONCOLOGY | Age: 55
End: 2020-08-07

## 2020-08-07 NOTE — PROGRESS NOTES
8/7/2020:     1345:   Called patient and confirmed x2 identifiers   Reviewed upcoming appointment with FREDRICK Sarmiento and MD Escamilla and attempted to offer virtual visit   Patient declined virtual visit at this time  Patient requested to change appointment and [facility] to 8/31/2020; patient stated was going to be out of town for 10 days     Patient also requested that MD Kelsi Reynolds with his Oncology Surgeon MD Natalya Lindsey ([phone]) and Endocrinologist MD Ann Marie Espinoza ([phone]) to make sure plan of care is accurate     Will provide updates to MD Escamilla and FREDRICK Roche

## 2020-08-11 ENCOUNTER — HOSPITAL ENCOUNTER (OUTPATIENT)
Dept: INFUSION THERAPY | Age: 55
End: 2020-08-11

## 2020-08-18 ENCOUNTER — DOCUMENTATION ONLY (OUTPATIENT)
Dept: ONCOLOGY | Age: 55
End: 2020-08-18

## 2020-08-18 NOTE — PROGRESS NOTES
Called Oncology Surgeon MD Maynard's office in regards to patient   [phone]   Patient last seen in office in 2019; next appointment later on this year   Requested office to call to provide any updates in regards to plan of care or have any further recommendations at this time time   Office number provided     Called Endocrinologist MD Kemi Mi in regards to patient and plan of care   [phone]   Patient last seen via telephone encounter in June 2020   Requested notes to be faxed to office   Office fax number provided

## 2020-08-24 ENCOUNTER — APPOINTMENT (OUTPATIENT)
Dept: INFUSION THERAPY | Age: 55
End: 2020-08-24

## 2020-08-31 ENCOUNTER — APPOINTMENT (OUTPATIENT)
Dept: INFUSION THERAPY | Age: 55
End: 2020-08-31

## 2020-09-01 ENCOUNTER — HOSPITAL ENCOUNTER (OUTPATIENT)
Dept: INFUSION THERAPY | Age: 55
Discharge: HOME | End: 2020-09-01
Payer: MEDICARE

## 2020-09-01 ENCOUNTER — DOCUMENTATION ONLY (OUTPATIENT)
Dept: ONCOLOGY | Age: 55
End: 2020-09-01

## 2020-09-01 VITALS
HEART RATE: 94 BPM | TEMPERATURE: 97.7 F | SYSTOLIC BLOOD PRESSURE: 108 MMHG | RESPIRATION RATE: 18 BRPM | DIASTOLIC BLOOD PRESSURE: 72 MMHG

## 2020-09-01 DIAGNOSIS — C7A.019 MALIGNANT CARCINOID TUMOR OF SMALL INTESTINE, UNSPECIFIED LOCATION (HCC): Primary | ICD-10-CM

## 2020-09-01 PROCEDURE — 96372 THER/PROPH/DIAG INJ SC/IM: CPT

## 2020-09-01 PROCEDURE — 74011250636 HC RX REV CODE- 250/636: Performed by: REGISTERED NURSE

## 2020-09-01 RX ADMIN — OCTREOTIDE ACETATE 60 MG: KIT at 17:45

## 2020-09-01 NOTE — PROGRESS NOTES
[facility] Progress Note    Date: 2020    Name: Hamlet Pierce    MRN: [id number]         : 1965        1725: Pt arrived ambulatory to [facility] for Sandostatin in stable condition. Assessment completed. No other complaints voiced at this time. Patient Vitals for the past 12 hrs:   Temp Pulse Resp BP   20 1727 97.7 °F (36.5 °C) 94 18 108/72          Medications Administered     octreotide acetate microspheres (SANDOSTATIN LAR DEPOT) injection 60 mg     Admin Date  2020 Action  Given Dose  60 mg Route  IntraMUSCular Administered By  Ambrocio Sotelo RN                  4897: Tolerated treatment well, no adverse reactions noted. D/Cd from [facility] ambulatory and in no distress.       Future Appointments   Date Time Provider Cheyanne Miramontes   2020  2:00 PM G1 NAI CLARK [facility]   10/27/2020  2:00 PM G1 NAI CLARK [address], RN  2020

## 2020-09-08 ENCOUNTER — APPOINTMENT (OUTPATIENT)
Dept: INFUSION THERAPY | Age: 55
End: 2020-09-08

## 2020-09-21 ENCOUNTER — APPOINTMENT (OUTPATIENT)
Dept: INFUSION THERAPY | Age: 55
End: 2020-09-21

## 2020-09-29 ENCOUNTER — OFFICE VISIT (OUTPATIENT)
Dept: ONCOLOGY | Age: 55
End: 2020-09-29
Payer: MEDICARE

## 2020-09-29 ENCOUNTER — HOSPITAL ENCOUNTER (OUTPATIENT)
Dept: INFUSION THERAPY | Age: 55
Discharge: HOME | End: 2020-09-29

## 2020-09-29 VITALS
SYSTOLIC BLOOD PRESSURE: 132 MMHG | OXYGEN SATURATION: 97 % | WEIGHT: 175 LBS | HEART RATE: 132 BPM | HEIGHT: 68 IN | DIASTOLIC BLOOD PRESSURE: 89 MMHG | BODY MASS INDEX: 26.52 KG/M2 | TEMPERATURE: 96.6 F

## 2020-09-29 VITALS — HEART RATE: 121 BPM | SYSTOLIC BLOOD PRESSURE: 125 MMHG | TEMPERATURE: 97.6 F | DIASTOLIC BLOOD PRESSURE: 84 MMHG

## 2020-09-29 DIAGNOSIS — C7A.019 MALIGNANT CARCINOID TUMOR OF SMALL INTESTINE, UNSPECIFIED LOCATION (HCC): Primary | ICD-10-CM

## 2020-09-29 PROCEDURE — 99214 OFFICE O/P EST MOD 30 MIN: CPT | Performed by: INTERNAL MEDICINE

## 2020-09-29 PROCEDURE — G0463 HOSPITAL OUTPT CLINIC VISIT: HCPCS | Performed by: REGISTERED NURSE

## 2020-09-29 PROCEDURE — G8417 CALC BMI ABV UP PARAM F/U: HCPCS | Performed by: INTERNAL MEDICINE

## 2020-09-29 PROCEDURE — G8510 SCR DEP NEG, NO PLAN REQD: HCPCS | Performed by: INTERNAL MEDICINE

## 2020-09-29 PROCEDURE — 3017F COLORECTAL CA SCREEN DOC REV: CPT | Performed by: INTERNAL MEDICINE

## 2020-09-29 PROCEDURE — G8427 DOCREV CUR MEDS BY ELIG CLIN: HCPCS | Performed by: INTERNAL MEDICINE

## 2020-09-29 NOTE — PROGRESS NOTES
Romina Duke is a 63 y.o. male  Chief Complaint   Patient presents with    Follow-up     malignant carcinoid tumor of the small intestine with metastasis to the liver     1. Have you been to the ER, urgent care clinic since your last visit? Hospitalized since your last visit? No    2. Have you seen or consulted any other health care providers outside of the [address] [place] [address] since your last visit? Include any pap smears or colon screening. No

## 2020-09-29 NOTE — PROGRESS NOTES
[facility] Progress Note    Date: 2020    Name: Porter Garrido    MRN: [id number]         : 1965        1600 Pt arrived ambulatory to [facility] for Sandostatin in stable condition. Assessment completed. No other complaints voiced at this time. Pt went to MD appt before arriving to [facility]. Pt appears to be intoxicated; pt being rude to nursing staff and talking off hand. Pt refusing to have labs drawn today. 80-Patricia Roe NP, notified nurse manager that security will be coming to [facility] to speak with the pt. Security officers with pt who states \"I've been drinking all night trying to make it from [place]\". Nurse manager informed RN per Dr. Apurva Ramirez, pt is NOT to receive injection. 1700-pt asking to go to his car to get his glasses. Pt was seen driving away from [facility] parking area. 1715-pt returned to [facility]. Security waiting with pt for [employer]. 1830-Officers with pt. Call made to pt family member who will pick him up. Pt left [facility] accompanied by police officers.      Patient Vitals for the past 12 hrs:   Temp Pulse BP   20 1605 97.6 °F (36.4 °C) (!) 121 125/84              Future Appointments   Date Time Provider Cheyanne Miramontes   10/27/2020  2:00 PM Neo Camarena RN  2020

## 2020-10-06 ENCOUNTER — APPOINTMENT (OUTPATIENT)
Dept: INFUSION THERAPY | Age: 55
End: 2020-10-06

## 2020-10-19 ENCOUNTER — APPOINTMENT (OUTPATIENT)
Dept: INFUSION THERAPY | Age: 55
End: 2020-10-19
Payer: MEDICARE

## 2020-10-27 ENCOUNTER — APPOINTMENT (OUTPATIENT)
Dept: INFUSION THERAPY | Age: 55
End: 2020-10-27

## 2022-03-19 PROBLEM — C7A.019 CARCINOID TUMOR OF SMALL INTESTINE, MALIGNANT (HCC): Status: ACTIVE | Noted: 2019-07-31

## 2023-05-11 RX ORDER — FLUTICASONE PROPIONATE 50 MCG
2 SPRAY, SUSPENSION (ML) NASAL DAILY
COMMUNITY

## 2023-05-11 RX ORDER — DEXTROAMPHETAMINE SACCHARATE, AMPHETAMINE ASPARTATE, DEXTROAMPHETAMINE SULFATE AND AMPHETAMINE SULFATE 5; 5; 5; 5 MG/1; MG/1; MG/1; MG/1
TABLET ORAL 2 TIMES DAILY
COMMUNITY

## 2023-05-11 RX ORDER — ATORVASTATIN CALCIUM 10 MG/1
TABLET, FILM COATED ORAL DAILY
COMMUNITY

## 2023-05-11 RX ORDER — PHENTERMINE HYDROCHLORIDE 15 MG/1
CAPSULE ORAL
COMMUNITY

## 2024-09-26 NOTE — PROGRESS NOTES
[facility] [place] [address] [address], [address]: [phone]  F: [phone]    Reason for Visit:   Barbara Garza is a 63 y.o. 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 Pathology showed well differentiated and low grade carcinoid tumor  · Radioembolization (Y-90) at some point  · PET 4/24/19: Overall burden of disease unchanged but uptake increased  · CT C/A/P 2/24/20 with mild worsening rosalind mets at celiac axis, retroperitoneum  · Maintenance with Sandostatin 60 mg every 4 weeks  · 7/15/2020 PET: progressive disease     History of Present Illness: This is a 63 y.o. 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 He has had care over the years under multiple specialists most based in [place] [address]- including endocrinologist, Dr. Rosie Su,  [facility], surgical oncology. He was originally diagnosed in 2008 and at that time had aggressive debulking with right hemicolectomy, lumpectomy, and partial resection of the liver metastases. He had radioembolization (Y-90) at some point as well. He has been on Sandostatin 80 mg every 2 weeks for quite some time. Has now been following with Dr. Laurita Bello at [facility]. Did not have treatment since January 2019  due incarceration. But then eventually resumed Sandostatin at 60 mcg monthly with Dr. Elvin Piper. He comes today for follow-up and transfer of care after Dr. Elvin Piper departure. His most recent imaging was in July 2020 at [facility]. This showed progression of disease. Patient states he has diarrhea two times a day.  He has heart palpitations and flushing. He has no pain. Unable to obtain further ROS as patient would not answer further questions. He seemed distracted and difficult to direct  He is a current smoker    Reviewed PMH and PSH      Social History     Tobacco Use    Smoking status: Current Every Day Smoker     Packs/day: 0.50     Types: Cigarettes    Smokeless tobacco: Never Used   Substance Use Topics    Alcohol use: Yes     Frequency: 4 or more times a week     Drinks per session: 1 or 2      No family history on file. Current Outpatient Medications   Medication Sig    phentermine (ADIPEX_P) 15 mg capsule Take 15 mg by mouth every morning.  atorvastatin (LIPITOR) 10 mg tablet Take  by mouth daily.  fluticasone propionate (FLONASE ALLERGY RELIEF) 50 mcg/actuation nasal spray 2 Sprays by Both Nostrils route daily.  dextroamphetamine-amphetamine (ADDERALL) 20 mg tablet Take 20 mg by mouth two (2) times a day. No current facility-administered medications for this visit. No Known Allergies     Review of Systems: A complete review of systems was obtained, negative except as described above. Physical Exam:     Visit Vitals  /89   Pulse (!) 132   Temp (!) 96.6 °F (35.9 °C)   Ht 5' 8\" (1.727 m)   Wt 175 lb (79.4 kg)   SpO2 97%   BMI 26.61 kg/m²     ECOG PS: 0  General: irritable, appears under the influence   Eyes: PERRL  HENT: Atraumatic  CV: no peripheral edema  MS: Normal gait and station. Unsteady on feet   Psych: Alert, irritable    Limited exam   Results:   ECHO 09/21/17  IMPRESSION  Normal LV function  Mild diastolic dysfunction    PET 4/24/19   IMPRESSION  The overall burden of disease is unchanged anatomically, with no new anatomic findings.  However, the degree of radiotracer uptake involving the majority of lesions has significantly increased in the interval, and new uptake is noted in anatomically stable mesenteric soft tissue nodules    CT C/A/P 2/24/20  IMPRESSION  Mild worsening rosalind metastases at celiac axis, retroperitoneum. Similar mesenteric mass sigmoid, lymph node adjacent to descending colon, omental nodule, nodule posterior right lobe liver, pancreatic lesion, left perinephric nodule, deep pelvic mass. Bilateral nephrolithiasis including a renal pelvis stone on the left. No hydronephrosis. No signs of recently passed stone. Outside records reviewed. 7/2020 DOTATATE    IMPRESSION    Worsening metastatic neuroendocrine tumor since 2/3/2019.  -Metastatic lymph nodes of the lower right chest and abdomen, increased. -Metastatic implants along the abdominal and pelvic peritoneum/retroperitoneum and omentum, increased. -Metastatic liver lesions are stable. -Development of small bone metastases to C1 and T6. Assessment:   1. Malignant carcinoid tumor of the small intestine with metastasis to the liver/ peritoneum and bones  Originally diagnosed in 2008 and had aggressive debulking with right hemicolectomy, lumpectomy, and partial resection of liver metastases  Pathology showed well differentiated and low grade carcinoid tumor  He had radioembolization (Y-90) of the liver at some point    On palliative Sandostatin 60 mg monthly with minimal to no carcinoid symptoms  Care under multiple specialists with PET on  7/2020 at outside facility that showed increase in size of metastatic lymph nodes in the lower abdomen and lower chest. His liver mets were stable. He also had new bone mets at C1 and T6. He is asymptomatic from these bone lesions. Has high tumor bulk and may benefit at some point from MTOR inhibitors/ PRRT. He was not open to any recommendations or attempts to help consolidate care. His focus was to get 80 mcg of Octreotide every 2 weeks and states that \" if we cant give it to him then he will find someone else\".  We discussed that we will be happy to work on insurance approval for the same to which his response was\" it is all approved\"- used some abusive language and Improving walked out of his clinic appointment    We were unable to meaningfully help him as he refused our help/ redirection and appeared to demonstrate inappropriate behavior    2. Psychosocial  He is unemployed and on disability  He has been on Medicaid who helps pay for his Sandostatin which is expensive  He is not open to our recommendations, actively listened to him though patient was abusive to providers and staff    Plan:     · Change Sandostatin to 80 mg every 2 weeks  Follow up to be determined    I appreciate the opportunity to participate in Mr. Indu Motley care.     Seen in conjunction with Emely Theodore MD, [address] [facility] Improving Improving Improving Improving Improving Improving Improving Improving Improving Improving